# Patient Record
(demographics unavailable — no encounter records)

---

## 2025-02-03 NOTE — IMAGING
[de-identified] : Physical exam of the left knee: No effusion, no erythema or ecchymosis.  Good range of motion with flexion and extension of the left knee.  No tenderness to palpation over the joint lines.  No tenderness to palpation over the anterior aspect of the knee or the collateral ligaments.  Stable to varus and valgus stress testing, negative Mars's testing.  Intact to light touch, mild antalgic gait.  Physical exam of the lumbar spine: No edema, no erythema or ecchymosis.  No test palpation over the lumbar vertebrae, mild tenderness to palpation over the left-sided lumbar paraspinal muscles.  Good range of motion.  5 out of 5 lower extremity strength bilaterally.  Physical exam of the left hip: Good range of motion with flexion extension, external and internal rotation.  Good range of motion with abduction and adduction.  No tenderness to palpation over the anterior superior iliac crest.  No tenderness to palpation over the groin.  No tenderness to palpation over the greater trochanter.  No tenderness to palpation over the left anterior proximal thigh, mid thigh or distal thigh.  Intact to light touch, nonantalgic gait.

## 2025-02-03 NOTE — DISCUSSION/SUMMARY
[de-identified] : I reviewed the x-ray findings with the patient.  Today I recommend a cortisone injection for the left knee.  The patient agreed.  The left knee was injected with 2 cc lidocaine, 1 cc dexamethasone.  Procedure note generated.  She was taught formal physical therapy for her left knee and her lumbar spine.  Rx provided for that.  I will see her back in 8 weeks for further evaluation, if she is still having back pain at that point I would recommend an x-ray and an MRI of the lumbar spine and possibly an x-ray of the left hip.

## 2025-02-03 NOTE — DATA REVIEWED
[Left] : left [Knee] : knee [FreeTextEntry1] : 3 views of the left knee were obtained here in the office today and show: No fracture or dislocation.  Well-preserved medial and lateral compartments.  On the lateral view there is mild patellofemoral compartment space narrowing.

## 2025-02-03 NOTE — PROCEDURE
[Large Joint Injection] : Large joint injection [Left] : of the left [Knee] : knee [Pain] : pain [Alcohol] : alcohol [___ cc    1%] : Lidocaine ~Vcc of 1%  [___ cc    4mg] : Dexamethasone (Decadron) ~Vcc of 4 mg  [Risks, benefits, alternatives discussed / Verbal consent obtained] : the risks benefits, and alternatives have been discussed, and verbal consent was obtained [All ultrasound images have been permanently captured and stored accordingly in our picture archiving and communication system] : All ultrasound images have been permanently captured and stored accordingly in our picture archiving and communication system [Visualization of the needle and placement of injection was performed without complication] : visualization of the needle and placement of injection was performed without complication

## 2025-02-03 NOTE — HISTORY OF PRESENT ILLNESS
[de-identified] : The patient is a 52-year-old female here for a subsequent reevaluation of her left knee.  She has chondromalacia.  She had a cortisone injection here n August 2024 which has helped her knee.  She still gets some pain in the knee going up the stairs.  She does relate that she is having lower back pain that radiates down the lateral aspect of the left leg to just above her knee.  This occurred over the past few months.  No trauma to the area.

## 2025-03-06 NOTE — ASSESSMENT
[FreeTextEntry1] : This is a 52-year-old female with complaints of lower back pain with radicular features into the left lower extremity.  The pain has been ongoing for 3 years and continues to progress. Her pain is described as a pulling, stiff sensation that travels into the left shin. We will obtain an MRI of the lumbar spine for further evaluation. In the interim, I recommend she begin trialing physical therapy and I will Meloxicam 7.5 BID 30 days #60 to the pharmacy for symptom control. She will follow up in 3 weeks to review imaging.  All this patients questions were answered and the conversation was understood well.  Lumbar MRI without contrast was ordered to delineate spine pathology such as disc displacement and stenosis.  Physical therapy of the lumbar spine 2-3 times a week for 4-8 weeks stressing a home exercise program of walking, shoulder girdle strengthening,  swimming, elliptical , recumbent bike, [employer] chi and Yoga. Use things that heat like hot shower or icy heat before rehab, exercising and at the beginning of the day, and ice (ice in a bag never directly on the skin) after activity and at the end of the day.  Entered by Monse Anders, acting as scribe for Dr. Beltre.  Documentation recorded by the scribe, in my presence, accurately reflects the service I personally performed, and the decisions made by me with my edits as appropriate.     Thank you for allowing me to assist in the management of this patient.     Best Regards,     Nella Beltre M.D., FAAPMR     Diplomate, American Board of Physical Medicine and Rehabilitation Diplomate, American Board of Pain Medicine

## 2025-03-06 NOTE — PHYSICAL EXAM
[Normal Coordination] : normal coordination [Normal DTR UE/LE] : normal DTR UE/LE  [Normal Sensation] : normal sensation [Normal Mood and Affect] : normal mood and affect [Oriented] : oriented [Able to Communicate] : able to communicate [Well Developed] : well developed [Well Nourished] : well nourished [Flexion] : flexion [Extension] : extension [Absent] : achilles reflex absent [de-identified] : obese [NL (90)] : forward flexion 90 degrees [NL (30)] : extension 30 degrees [] : achilles and patella reflexes are not symmetrical

## 2025-03-06 NOTE — HISTORY OF PRESENT ILLNESS
[FreeTextEntry1] : This is a 52-year-old female here to establish care for lower back pain with radicular features into the left lower extremity. The pain travels down into the shin. It is described as a pulling sensation. Pain initially started about 3 years ago and has gotten progressive worse. She has difficulty sitting and standing secondary to pain. She reports stiffness in the lower back.

## 2025-03-27 NOTE — PHYSICAL EXAM
[Normal Coordination] : normal coordination [Normal DTR UE/LE] : normal DTR UE/LE  [Normal Sensation] : normal sensation [Normal Mood and Affect] : normal mood and affect [Oriented] : oriented [Able to Communicate] : able to communicate [Well Developed] : well developed [Well Nourished] : well nourished [NL (90)] : forward flexion 90 degrees [NL (30)] : extension 30 degrees [Flexion] : flexion [Extension] : extension [Absent] : achilles reflex absent [de-identified] : obese [] : achilles and patella reflexes are not symmetrical

## 2025-03-27 NOTE — ASSESSMENT
[FreeTextEntry1] : This is a 52-year-old female with complaints of lower back pain with radicular features into the bilateral lower extremities. The pain has been ongoing for 3 years and continues to progress. Her pain is described as a pulling, stiff sensation.  Imaging studies as well as physical exam findings corroborate the symptomatology. I will proceed with a B/L L4-5 TFESI x1. Patient will follow up in 1-2 weeks post injection for reassessment. All this patients questions were answered and the conversation was understood well.  Patient had a MRI that shows a radicular component along with pain referred into the lower extremities. Patient has trialed rehab (home exercise, physical therapy or chiropractic care) and medications. I will schedule a bilateralL4-5 TFESI.  Risk including bleeding, infection, possible nerve damage, benefits, pros and cons of procedure were explained to the patient using models and diagrams and their questions were answered.  Entered by Monse Anders, acting as scribe for Dr. Beltre.  Documentation recorded by the scribe, in my presence, accurately reflects the service I personally performed, and the decisions made by me with my edits as appropriate.     Thank you for allowing me to assist in the management of this patient.     Best Regards,     Nella Beltre M.D., FAAPMR     Diplomate, American Board of Physical Medicine and Rehabilitation Diplomate, American Board of Pain Medicine

## 2025-03-27 NOTE — DATA REVIEWED
[FreeTextEntry1] : MRI of the lumbar spine dated 3/13/2025 demonstrates Spondylosis is greatest at L4-5 where there is moderate to severe central canal stenosis from large central disc extrusion, severe hypertrophic facet arthropathy, large facet effusions and moderate to severe bilateral foraminal stenosis, including grade 1 anterior listhesis.